# Patient Record
Sex: MALE | URBAN - METROPOLITAN AREA
[De-identification: names, ages, dates, MRNs, and addresses within clinical notes are randomized per-mention and may not be internally consistent; named-entity substitution may affect disease eponyms.]

---

## 2020-05-08 ENCOUNTER — OFFICE VISIT (OUTPATIENT)
Dept: URBAN - METROPOLITAN AREA CLINIC 11 | Facility: CLINIC | Age: 65
End: 2020-05-08
Payer: COMMERCIAL

## 2020-05-08 PROCEDURE — 92004 COMPRE OPH EXAM NEW PT 1/>: CPT | Performed by: OPTOMETRIST

## 2020-05-08 ASSESSMENT — INTRAOCULAR PRESSURE
OS: 15
OD: 15

## 2020-05-08 ASSESSMENT — KERATOMETRY
OD: 40.75
OS: 42.00

## 2021-08-10 ENCOUNTER — OFFICE VISIT (OUTPATIENT)
Dept: URBAN - METROPOLITAN AREA CLINIC 11 | Facility: CLINIC | Age: 66
End: 2021-08-10
Payer: COMMERCIAL

## 2021-08-10 DIAGNOSIS — H25.13 AGE-RELATED NUCLEAR CATARACT, BILATERAL: Primary | ICD-10-CM

## 2021-08-10 PROCEDURE — 99213 OFFICE O/P EST LOW 20 MIN: CPT | Performed by: OPTOMETRIST

## 2021-08-10 ASSESSMENT — KERATOMETRY
OS: 42.00
OD: 40.50

## 2021-08-10 ASSESSMENT — INTRAOCULAR PRESSURE
OD: 12
OS: 12

## 2021-08-10 ASSESSMENT — VISUAL ACUITY
OS: 20/25
OD: 20/40

## 2021-08-10 NOTE — IMPRESSION/PLAN
Impression: Age-related nuclear cataract, bilateral: H25.13. OU. Plan: Will continue to observe condition and or symptoms.

## 2021-08-27 ENCOUNTER — TESTING ONLY (OUTPATIENT)
Dept: URBAN - METROPOLITAN AREA CLINIC 11 | Facility: CLINIC | Age: 66
End: 2021-08-27

## 2021-08-27 DIAGNOSIS — H52.223 REGULAR ASTIGMATISM, BILATERAL: Primary | ICD-10-CM

## 2021-08-27 ASSESSMENT — VISUAL ACUITY
OD: 20/30
OS: 20/30